# Patient Record
Sex: FEMALE | Race: WHITE | NOT HISPANIC OR LATINO | Employment: OTHER | URBAN - METROPOLITAN AREA
[De-identification: names, ages, dates, MRNs, and addresses within clinical notes are randomized per-mention and may not be internally consistent; named-entity substitution may affect disease eponyms.]

---

## 2023-06-14 ENCOUNTER — OFFICE VISIT (OUTPATIENT)
Dept: PRIMARY CARE | Facility: CLINIC | Age: 83
End: 2023-06-14
Payer: MEDICARE

## 2023-06-14 ENCOUNTER — LAB (OUTPATIENT)
Dept: LAB | Facility: LAB | Age: 83
End: 2023-06-14
Payer: MEDICARE

## 2023-06-14 VITALS
SYSTOLIC BLOOD PRESSURE: 130 MMHG | DIASTOLIC BLOOD PRESSURE: 80 MMHG | WEIGHT: 163 LBS | BODY MASS INDEX: 31.83 KG/M2 | HEART RATE: 70 BPM

## 2023-06-14 DIAGNOSIS — I10 PRIMARY HYPERTENSION: ICD-10-CM

## 2023-06-14 DIAGNOSIS — Z00.00 PHYSICAL EXAM, ANNUAL: ICD-10-CM

## 2023-06-14 DIAGNOSIS — N18.31 STAGE 3A CHRONIC KIDNEY DISEASE (MULTI): ICD-10-CM

## 2023-06-14 DIAGNOSIS — Z00.00 MEDICARE ANNUAL WELLNESS VISIT, SUBSEQUENT: Primary | ICD-10-CM

## 2023-06-14 PROBLEM — M85.80 OSTEOPENIA: Status: ACTIVE | Noted: 2023-06-14

## 2023-06-14 PROBLEM — M19.90 OSTEOARTHRITIS (ARTHRITIS DUE TO WEAR AND TEAR OF JOINTS): Status: ACTIVE | Noted: 2023-06-14

## 2023-06-14 PROBLEM — H26.9 CATARACT: Status: ACTIVE | Noted: 2023-06-14

## 2023-06-14 PROBLEM — R73.03 PREDIABETES: Status: ACTIVE | Noted: 2023-06-14

## 2023-06-14 PROBLEM — M54.50 LOW BACK PAIN: Status: ACTIVE | Noted: 2023-06-14

## 2023-06-14 PROBLEM — E78.5 HYPERLIPIDEMIA: Status: ACTIVE | Noted: 2023-06-14

## 2023-06-14 PROBLEM — H40.9 GLAUCOMA: Status: ACTIVE | Noted: 2023-06-14

## 2023-06-14 LAB
ALANINE AMINOTRANSFERASE (SGPT) (U/L) IN SER/PLAS: 9 U/L (ref 7–45)
ALBUMIN (G/DL) IN SER/PLAS: 3.7 G/DL (ref 3.4–5)
ALKALINE PHOSPHATASE (U/L) IN SER/PLAS: 63 U/L (ref 33–136)
ANION GAP IN SER/PLAS: 13 MMOL/L (ref 10–20)
ASPARTATE AMINOTRANSFERASE (SGOT) (U/L) IN SER/PLAS: 12 U/L (ref 9–39)
BASOPHILS (10*3/UL) IN BLOOD BY AUTOMATED COUNT: 0.06 X10E9/L (ref 0–0.1)
BASOPHILS/100 LEUKOCYTES IN BLOOD BY AUTOMATED COUNT: 0.8 % (ref 0–2)
BILIRUBIN TOTAL (MG/DL) IN SER/PLAS: 0.6 MG/DL (ref 0–1.2)
CALCIUM (MG/DL) IN SER/PLAS: 9.1 MG/DL (ref 8.6–10.3)
CARBON DIOXIDE, TOTAL (MMOL/L) IN SER/PLAS: 25 MMOL/L (ref 21–32)
CHLORIDE (MMOL/L) IN SER/PLAS: 106 MMOL/L (ref 98–107)
CREATININE (MG/DL) IN SER/PLAS: 1.2 MG/DL (ref 0.5–1.05)
EOSINOPHILS (10*3/UL) IN BLOOD BY AUTOMATED COUNT: 0.27 X10E9/L (ref 0–0.4)
EOSINOPHILS/100 LEUKOCYTES IN BLOOD BY AUTOMATED COUNT: 3.4 % (ref 0–6)
ERYTHROCYTE DISTRIBUTION WIDTH (RATIO) BY AUTOMATED COUNT: 13.2 % (ref 11.5–14.5)
ERYTHROCYTE MEAN CORPUSCULAR HEMOGLOBIN CONCENTRATION (G/DL) BY AUTOMATED: 31.6 G/DL (ref 32–36)
ERYTHROCYTE MEAN CORPUSCULAR VOLUME (FL) BY AUTOMATED COUNT: 95 FL (ref 80–100)
ERYTHROCYTES (10*6/UL) IN BLOOD BY AUTOMATED COUNT: 4.58 X10E12/L (ref 4–5.2)
GFR FEMALE: 45 ML/MIN/1.73M2
GLUCOSE (MG/DL) IN SER/PLAS: 97 MG/DL (ref 74–99)
HEMATOCRIT (%) IN BLOOD BY AUTOMATED COUNT: 43.3 % (ref 36–46)
HEMOGLOBIN (G/DL) IN BLOOD: 13.7 G/DL (ref 12–16)
IMMATURE GRANULOCYTES/100 LEUKOCYTES IN BLOOD BY AUTOMATED COUNT: 0.4 % (ref 0–0.9)
LEUKOCYTES (10*3/UL) IN BLOOD BY AUTOMATED COUNT: 8 X10E9/L (ref 4.4–11.3)
LYMPHOCYTES (10*3/UL) IN BLOOD BY AUTOMATED COUNT: 1.5 X10E9/L (ref 0.8–3)
LYMPHOCYTES/100 LEUKOCYTES IN BLOOD BY AUTOMATED COUNT: 18.9 % (ref 13–44)
MONOCYTES (10*3/UL) IN BLOOD BY AUTOMATED COUNT: 0.49 X10E9/L (ref 0.05–0.8)
MONOCYTES/100 LEUKOCYTES IN BLOOD BY AUTOMATED COUNT: 6.2 % (ref 2–10)
NEUTROPHILS (10*3/UL) IN BLOOD BY AUTOMATED COUNT: 5.6 X10E9/L (ref 1.6–5.5)
NEUTROPHILS/100 LEUKOCYTES IN BLOOD BY AUTOMATED COUNT: 70.3 % (ref 40–80)
PLATELETS (10*3/UL) IN BLOOD AUTOMATED COUNT: 175 X10E9/L (ref 150–450)
POTASSIUM (MMOL/L) IN SER/PLAS: 4.9 MMOL/L (ref 3.5–5.3)
PROTEIN TOTAL: 6.3 G/DL (ref 6.4–8.2)
SODIUM (MMOL/L) IN SER/PLAS: 139 MMOL/L (ref 136–145)
UREA NITROGEN (MG/DL) IN SER/PLAS: 20 MG/DL (ref 6–23)

## 2023-06-14 PROCEDURE — 3075F SYST BP GE 130 - 139MM HG: CPT | Performed by: FAMILY MEDICINE

## 2023-06-14 PROCEDURE — G0439 PPPS, SUBSEQ VISIT: HCPCS | Performed by: FAMILY MEDICINE

## 2023-06-14 PROCEDURE — 1170F FXNL STATUS ASSESSED: CPT | Performed by: FAMILY MEDICINE

## 2023-06-14 PROCEDURE — 36415 COLL VENOUS BLD VENIPUNCTURE: CPT

## 2023-06-14 PROCEDURE — 99397 PER PM REEVAL EST PAT 65+ YR: CPT | Performed by: FAMILY MEDICINE

## 2023-06-14 PROCEDURE — 99213 OFFICE O/P EST LOW 20 MIN: CPT | Performed by: FAMILY MEDICINE

## 2023-06-14 PROCEDURE — 80053 COMPREHEN METABOLIC PANEL: CPT

## 2023-06-14 PROCEDURE — 85025 COMPLETE CBC W/AUTO DIFF WBC: CPT

## 2023-06-14 PROCEDURE — 3079F DIAST BP 80-89 MM HG: CPT | Performed by: FAMILY MEDICINE

## 2023-06-14 PROCEDURE — 1036F TOBACCO NON-USER: CPT | Performed by: FAMILY MEDICINE

## 2023-06-14 PROCEDURE — 1160F RVW MEDS BY RX/DR IN RCRD: CPT | Performed by: FAMILY MEDICINE

## 2023-06-14 PROCEDURE — 1159F MED LIST DOCD IN RCRD: CPT | Performed by: FAMILY MEDICINE

## 2023-06-14 RX ORDER — CARVEDILOL 25 MG/1
25 TABLET ORAL
COMMUNITY
End: 2023-06-14 | Stop reason: SDUPTHER

## 2023-06-14 RX ORDER — CARVEDILOL 25 MG/1
25 TABLET ORAL
Qty: 180 TABLET | Refills: 1 | Status: SHIPPED | OUTPATIENT
Start: 2023-06-14 | End: 2024-01-16 | Stop reason: SDUPTHER

## 2023-06-14 ASSESSMENT — ENCOUNTER SYMPTOMS
DEPRESSION: 0
OCCASIONAL FEELINGS OF UNSTEADINESS: 0
LOSS OF SENSATION IN FEET: 0

## 2023-06-14 ASSESSMENT — ACTIVITIES OF DAILY LIVING (ADL)
TAKING_MEDICATION: INDEPENDENT
MANAGING_FINANCES: INDEPENDENT
BATHING: INDEPENDENT
DOING_HOUSEWORK: INDEPENDENT
GROCERY_SHOPPING: INDEPENDENT
DRESSING: INDEPENDENT

## 2023-06-14 ASSESSMENT — PATIENT HEALTH QUESTIONNAIRE - PHQ9
SUM OF ALL RESPONSES TO PHQ9 QUESTIONS 1 AND 2: 0
1. LITTLE INTEREST OR PLEASURE IN DOING THINGS: NOT AT ALL
1. LITTLE INTEREST OR PLEASURE IN DOING THINGS: NOT AT ALL
2. FEELING DOWN, DEPRESSED OR HOPELESS: NOT AT ALL
2. FEELING DOWN, DEPRESSED OR HOPELESS: NOT AT ALL
SUM OF ALL RESPONSES TO PHQ9 QUESTIONS 1 AND 2: 0

## 2023-06-14 NOTE — ASSESSMENT & PLAN NOTE
Stable. No changes to medical management    [Normal] : normal rate, regular rhythm, normal S1 and S2 and no murmur heard [No Carotid Bruits] : no carotid bruits [No Edema] : there was no peripheral edema [de-identified] : unable to touch left 5th finger to thumb

## 2023-06-14 NOTE — PROGRESS NOTES
Subjective   Patient ID: Gloria Yousif is a 82 y.o. female who presents for Medicare Annual Wellness Visit Subsequent and Hypertension, CKD.    HPI  1.  Medicare wellness/physical exam  Overall patient is doing well.   Immunization: Tdap 2015   Up to date on PNA, declined Shingrix  OB/GYN: Declined mammogram  Diet: healthy diet  Exercise: active around the home  Tobacco: Denies use  EtOH: one glass of wine per day    2.  Hypertension  Blood pressure controlled on Carvedilol 25mg every day  Denies any chest pain, blurred vision, or lower extremity welling    3.  CKD stage 3  GFR 2021: 47  Denies any urinary changes    Review of Systems  All pertinent positive symptoms are included in the history of present illness.    All other systems have been reviewed and are negative and noncontributory to this patient's current ailments.     Allergies   Allergen Reactions    Amlodipine Unknown and Rash    Amoxicillin Rash        Immunization History   Administered Date(s) Administered    Influenza, High Dose Seasonal, Preservative Free 10/28/2019, 10/28/2020, 11/03/2021, 11/29/2022    Pfizer Purple Cap SARS-CoV-2 02/20/2021, 03/20/2021    Pneumococcal Polysaccharide PPSV23 09/29/2004, 10/28/2019    Td (adult), 5 Lf tetanus toxoid, preservative free, adsorbed 09/29/2004    Tdap 03/12/2015       Objective   Vitals:    06/14/23 1049 06/14/23 1058   BP: 130/82 130/80   Pulse: 70    Weight: 73.9 kg (163 lb)        Physical Exam  CONSTITUTIONAL - well nourished, well developed, looks like stated age, in no acute distress, not ill-appearing, and not tired appearing  SKIN - normal skin color and pigmentation, normal skin turgor without rash, lesions, or nodules visualized  HEAD - no trauma, normocephalic  NECK - supple without rigidity, no neck mass was observed, no thyromegaly or thyroid nodules  CHEST - clear to auscultation, no wheezing, no crackles and no rales, good effort  CARDIAC - regular rate and regular rhythm, no  skipped beats, no murmur  EXTREMITIES - no edema, no deformities  NEUROLOGICAL - normal gait, normal balance, normal motor, no ataxia, alert, oriented and no focal signs  PSYCHIATRIC - alert, pleasant and cordial, age-appropriate  IMMUNOLOGIC - no cervical lymphadenopathy     Assessment/Plan   Problem List Items Addressed This Visit       Stage 3a chronic kidney disease     Stable. No changes to medical management          Relevant Orders    Comprehensive metabolic panel    CBC and Auto Differential    Hypertension     Stable. No changes to medical management         Relevant Medications    carvedilol (Coreg) 25 mg tablet    Other Relevant Orders    Comprehensive metabolic panel    CBC and Auto Differential    Medicare annual wellness visit, subsequent - Primary     Questions reviewed  Immunizations up-to-date           Other Visit Diagnoses       Physical exam, annual        Complete history and physical examination was performed  We will notify of test results once available

## 2024-01-16 ENCOUNTER — OFFICE VISIT (OUTPATIENT)
Dept: PRIMARY CARE | Facility: CLINIC | Age: 84
End: 2024-01-16
Payer: MEDICARE

## 2024-01-16 VITALS
HEIGHT: 60 IN | HEART RATE: 76 BPM | WEIGHT: 161 LBS | BODY MASS INDEX: 31.61 KG/M2 | SYSTOLIC BLOOD PRESSURE: 120 MMHG | DIASTOLIC BLOOD PRESSURE: 82 MMHG

## 2024-01-16 DIAGNOSIS — R06.02 SOB (SHORTNESS OF BREATH) ON EXERTION: ICD-10-CM

## 2024-01-16 DIAGNOSIS — N18.31 STAGE 3A CHRONIC KIDNEY DISEASE (MULTI): ICD-10-CM

## 2024-01-16 DIAGNOSIS — I10 PRIMARY HYPERTENSION: ICD-10-CM

## 2024-01-16 DIAGNOSIS — Z00.00 PHYSICAL EXAM, ANNUAL: ICD-10-CM

## 2024-01-16 DIAGNOSIS — Z00.00 MEDICARE ANNUAL WELLNESS VISIT, SUBSEQUENT: Primary | ICD-10-CM

## 2024-01-16 DIAGNOSIS — M25.551 PAIN OF RIGHT HIP: ICD-10-CM

## 2024-01-16 PROCEDURE — 99397 PER PM REEVAL EST PAT 65+ YR: CPT | Performed by: FAMILY MEDICINE

## 2024-01-16 PROCEDURE — 99214 OFFICE O/P EST MOD 30 MIN: CPT | Performed by: FAMILY MEDICINE

## 2024-01-16 PROCEDURE — 1160F RVW MEDS BY RX/DR IN RCRD: CPT | Performed by: FAMILY MEDICINE

## 2024-01-16 PROCEDURE — 1170F FXNL STATUS ASSESSED: CPT | Performed by: FAMILY MEDICINE

## 2024-01-16 PROCEDURE — 3074F SYST BP LT 130 MM HG: CPT | Performed by: FAMILY MEDICINE

## 2024-01-16 PROCEDURE — 3079F DIAST BP 80-89 MM HG: CPT | Performed by: FAMILY MEDICINE

## 2024-01-16 PROCEDURE — 1123F ACP DISCUSS/DSCN MKR DOCD: CPT | Performed by: FAMILY MEDICINE

## 2024-01-16 PROCEDURE — 1159F MED LIST DOCD IN RCRD: CPT | Performed by: FAMILY MEDICINE

## 2024-01-16 PROCEDURE — 1158F ADVNC CARE PLAN TLK DOCD: CPT | Performed by: FAMILY MEDICINE

## 2024-01-16 PROCEDURE — G0439 PPPS, SUBSEQ VISIT: HCPCS | Performed by: FAMILY MEDICINE

## 2024-01-16 PROCEDURE — 1036F TOBACCO NON-USER: CPT | Performed by: FAMILY MEDICINE

## 2024-01-16 RX ORDER — CARVEDILOL 25 MG/1
25 TABLET ORAL
Qty: 180 TABLET | Refills: 1 | Status: SHIPPED | OUTPATIENT
Start: 2024-01-16 | End: 2024-07-14

## 2024-01-16 ASSESSMENT — PATIENT HEALTH QUESTIONNAIRE - PHQ9
2. FEELING DOWN, DEPRESSED OR HOPELESS: NOT AT ALL
2. FEELING DOWN, DEPRESSED OR HOPELESS: NOT AT ALL
SUM OF ALL RESPONSES TO PHQ9 QUESTIONS 1 AND 2: 0
1. LITTLE INTEREST OR PLEASURE IN DOING THINGS: NOT AT ALL
SUM OF ALL RESPONSES TO PHQ9 QUESTIONS 1 AND 2: 0
1. LITTLE INTEREST OR PLEASURE IN DOING THINGS: NOT AT ALL

## 2024-01-16 ASSESSMENT — ACTIVITIES OF DAILY LIVING (ADL)
GROCERY_SHOPPING: INDEPENDENT
DOING_HOUSEWORK: INDEPENDENT
MANAGING_FINANCES: INDEPENDENT
TAKING_MEDICATION: INDEPENDENT
DRESSING: INDEPENDENT
BATHING: INDEPENDENT

## 2024-01-16 ASSESSMENT — ENCOUNTER SYMPTOMS
LOSS OF SENSATION IN FEET: 0
OCCASIONAL FEELINGS OF UNSTEADINESS: 0
DEPRESSION: 0

## 2024-01-16 NOTE — PROGRESS NOTES
Subjective   Reason for Visit: Gloria Yousif is an 83 y.o. female here for a Medicare Annual Wellness Visit Subsequent and Hypertension.     Past Medical, Surgical, and Family History reviewed and updated in chart.    Reviewed all medications by prescribing practitioner or clinical pharmacist (such as prescriptions, OTCs, herbal therapies and supplements) and documented in the medical record.    HPI  1.  Medicare wellness/physical exam.  Overall patient is doing well.   Immunization: Tdap 2015   Up to date on PNA, declined Shingrix  OB/GYN: No further screening warranted  Colonoscopy: No further screening warranted  Diet: healthy diet  Exercise: active around the home  Tobacco: Denies use  EtOH: one glass of wine per day    2.  Hypertension.  Patient is doing well on carvedilol 25 mg twice daily  Blood pressure in office today is 120/82  Denies headache, syncope, or nausea  Requesting refill today    3.  Shortness of breath on exertion.  Gloria has conveyed that she has been experiencing shortness of breath during exertion for the past year. The severity of the symptoms has remained consistent since they first appeared. It is important to note that Gloria does not have a history of smoking, asthma, or Chronic Obstructive Pulmonary Disease (COPD). A review of her medical records indicates that the imaging of her heart and lungs from a chest x-ray and CT scan, conducted on 04/06/19, were normal.    During these episodes, Gloria reports that she does not find it challenging to breathe. She experiences shortness of breath only when engaging in activities such as ascending stairs. For instance, she narrated an instance where she climbed 30 steps and felt slightly breathless. However, Golria denies experiencing chest pains, palpitations, wheezing, syncope, orthopnea, or edema.    4.  Right hip pain.  Gloria has been experiencing pain in her right hip for the past six months, which has gradually escalated  over time. She denies any instances of injury and discloses that the pain is intermittent, seeming to alleviate with movement. Gloria describes the pain as originating in the lumbar spine area on the right side and radiating downwards to her lateral mid-thigh. However, she clarifies that the pain does not present in a shooting manner.    For the past six months, Gloria has been taking Aleve every morning. However, this has only provided her with minimal relief from her symptoms. Given her history of arthritis in her right ankle, Gloria suspects that the pain in her hip might be related to arthritic changes.    Review of Systems  All pertinent positive symptoms are included in the history of present illness.    All other systems have been reviewed and are negative and noncontributory to this patient's current ailments.    Past Medical History:   Diagnosis Date    Chronic kidney disease, stage 3a (CMS/HCC) 11/03/2021    Stage 3a chronic kidney disease     Past Surgical History:   Procedure Laterality Date    ANKLE ARTHROSCOPY W/ OPEN REPAIR  08/01/2014    Ankle Repair    BREAST LUMPECTOMY  08/01/2014    Breast Surgery Lumpectomy    COLONOSCOPY  08/01/2014    Colonoscopy (Fiberoptic)    EYE SURGERY  02/05/2015    Eye Surgery     Social History     Tobacco Use    Smoking status: Never    Smokeless tobacco: Never     No family history on file.  Allergies   Allergen Reactions    Amlodipine Unknown and Rash    Amoxicillin Rash     Immunization History   Administered Date(s) Administered    Influenza, High Dose Seasonal, Preservative Free 10/28/2019, 10/28/2020, 11/03/2021, 11/29/2022    Pfizer Purple Cap SARS-CoV-2 02/20/2021, 03/20/2021    Pneumococcal polysaccharide vaccine, 23-valent, age 2 years and older (PNEUMOVAX 23) 09/29/2004, 10/28/2019    Td vaccine, age 7 years and older (TENIVAC) 09/29/2004    Tdap vaccine, age 7 year and older (BOOSTRIX) 03/12/2015     Current Outpatient Medications   Medication  Instructions    carvedilol (COREG) 25 mg, oral, 2 times daily with meals       Patient Self Assessment of Health Status  Patient Self Assessment: Good    Nutrition and Exercise  Current Diet: Well Balanced Diet  Adequate Fluid Intake: Yes  Caffeine: Yes  Exercise Frequency: Infrequently    Functional Ability/Level of Safety  Cognitive Impairment Observed: No cognitive impairment observed  Cognitive Impairment Reported: No cognitive impairment reported by patient or family    Home Safety Risk Factors: None    Objective   Visit Vitals  /82   Pulse 76   Ht 1.524 m (5')   Wt 73 kg (161 lb)   BMI 31.44 kg/m²   Smoking Status Never   BSA 1.76 m²      No visits with results within 1 Month(s) from this visit.   Latest known visit with results is:   Lab on 06/14/2023   Component Date Value    Glucose 06/14/2023 97     Sodium 06/14/2023 139     Potassium 06/14/2023 4.9     Chloride 06/14/2023 106     Bicarbonate 06/14/2023 25     Anion Gap 06/14/2023 13     Urea Nitrogen 06/14/2023 20     Creatinine 06/14/2023 1.20 (H)     GFR Female 06/14/2023 45 (A)     Calcium 06/14/2023 9.1     Albumin 06/14/2023 3.7     Alkaline Phosphatase 06/14/2023 63     Total Protein 06/14/2023 6.3 (L)     AST 06/14/2023 12     Total Bilirubin 06/14/2023 0.6     ALT (SGPT) 06/14/2023 9     WBC 06/14/2023 8.0     RBC 06/14/2023 4.58     Hemoglobin 06/14/2023 13.7     Hematocrit 06/14/2023 43.3     MCV 06/14/2023 95     MCHC 06/14/2023 31.6 (L)     Platelets 06/14/2023 175     RDW 06/14/2023 13.2     Neutrophils % 06/14/2023 70.3     Immature Granulocytes %,* 06/14/2023 0.4     Lymphocytes % 06/14/2023 18.9     Monocytes % 06/14/2023 6.2     Eosinophils % 06/14/2023 3.4     Basophils % 06/14/2023 0.8     Neutrophils Absolute 06/14/2023 5.60 (H)     Lymphocytes Absolute 06/14/2023 1.50     Monocytes Absolute 06/14/2023 0.49     Eosinophils Absolute 06/14/2023 0.27     Basophils Absolute 06/14/2023 0.06        Physical Exam  CONSTITUTIONAL - well  nourished, well developed, looks like stated age, in no acute distress, not ill-appearing, and not tired appearing  SKIN - normal skin color and pigmentation, normal skin turgor without rash, lesions, or nodules visualized  HEAD - no trauma, normocephalic  EYES - pupils are equal and reactive to light, extraocular muscles are intact, and normal external exam  NECK - supple without rigidity, no neck mass was observed, no thyromegaly or thyroid nodules  CHEST - clear to auscultation, no wheezing, no crackles and no rales, good effort  CARDIAC - regular rate and regular rhythm, no skipped beats, no murmur  EXTREMITIES - no obvious or evident edema, no obvious or evident deformities  NEUROLOGICAL - normal gait, normal balance, normal motor, no ataxia; alert, oriented and no focal signs  PSYCHIATRIC - alert, pleasant and cordial, age-appropriate    Assessment/Plan   Problem List Items Addressed This Visit       Stage 3a chronic kidney disease (CMS/HCC)    Current Assessment & Plan     Please complete fasting labs at your earliest convenience. If GFR drops below 45, we should consider nephrology consult         Relevant Orders    Basic metabolic panel    Hypertension    Current Assessment & Plan     Stable, please continue medication as prescribed.         Relevant Medications    carvedilol (Coreg) 25 mg tablet    Other Relevant Orders    Basic metabolic panel    Medicare annual wellness visit, subsequent - Primary    Current Assessment & Plan     Questions were reviewed and answered         Pain of right hip    Current Assessment & Plan     I ordered an x-ray to assess for arthritis. Please complete within the next week. Once I have seen the results, we can discuss best next steps.         Relevant Orders    XR hip right with pelvis when performed 2 or 3 views    SOB (shortness of breath) on exertion    Current Assessment & Plan     I would like to rule out any cardiac causes. I have provided a referral to your preferred  provider, Melania Fan, [facility], because your  has been seen by her in the past, and you have requested to see her         Relevant Orders    Referral to Cardiology    Physical exam, annual    Current Assessment & Plan     Complete history and physical examination was performed  We will notify of test results once available          Patient Care Team:  Félix Arthur DO as PCP - General (Family Medicine)  Félix Arthur DO as PCP - [employer] PCP

## 2024-01-16 NOTE — ASSESSMENT & PLAN NOTE
I ordered an x-ray to assess for arthritis. Please complete within the next week. Once I have seen the results, we can discuss best next steps.

## 2024-01-16 NOTE — ASSESSMENT & PLAN NOTE
I would like to rule out any cardiac causes. I have provided a referral to your preferred provider, Melania Fan NP, because your  has been seen by her in the past, and you have requested to see her

## 2024-01-16 NOTE — ASSESSMENT & PLAN NOTE
Please complete fasting labs at your earliest convenience. If GFR drops below 45, we should consider nephrology consult

## 2024-01-17 ENCOUNTER — ANCILLARY PROCEDURE (OUTPATIENT)
Dept: RADIOLOGY | Facility: CLINIC | Age: 84
End: 2024-01-17
Payer: MEDICARE

## 2024-01-17 ENCOUNTER — LAB (OUTPATIENT)
Dept: LAB | Facility: LAB | Age: 84
End: 2024-01-17
Payer: MEDICARE

## 2024-01-17 DIAGNOSIS — M25.551 PAIN OF RIGHT HIP: ICD-10-CM

## 2024-01-17 DIAGNOSIS — N18.31 STAGE 3A CHRONIC KIDNEY DISEASE (MULTI): ICD-10-CM

## 2024-01-17 DIAGNOSIS — I10 PRIMARY HYPERTENSION: ICD-10-CM

## 2024-01-17 LAB
ANION GAP SERPL CALC-SCNC: 14 MMOL/L (ref 10–20)
BUN SERPL-MCNC: 22 MG/DL (ref 6–23)
CALCIUM SERPL-MCNC: 9.2 MG/DL (ref 8.6–10.6)
CHLORIDE SERPL-SCNC: 103 MMOL/L (ref 98–107)
CO2 SERPL-SCNC: 27 MMOL/L (ref 21–32)
CREAT SERPL-MCNC: 1.11 MG/DL (ref 0.5–1.05)
EGFRCR SERPLBLD CKD-EPI 2021: 49 ML/MIN/1.73M*2
GLUCOSE SERPL-MCNC: 115 MG/DL (ref 74–99)
POTASSIUM SERPL-SCNC: 4.3 MMOL/L (ref 3.5–5.3)
SODIUM SERPL-SCNC: 140 MMOL/L (ref 136–145)

## 2024-01-17 PROCEDURE — 73502 X-RAY EXAM HIP UNI 2-3 VIEWS: CPT | Mod: RT

## 2024-01-17 PROCEDURE — 80048 BASIC METABOLIC PNL TOTAL CA: CPT

## 2024-01-17 PROCEDURE — 36415 COLL VENOUS BLD VENIPUNCTURE: CPT

## 2024-01-17 PROCEDURE — 73502 X-RAY EXAM HIP UNI 2-3 VIEWS: CPT | Mod: RIGHT SIDE | Performed by: RADIOLOGY

## 2024-01-18 NOTE — RESULT ENCOUNTER NOTE
Looks like your kidney function has remained stable as have your electrolytes    I would recommend following up with me in 6 months please

## 2024-01-19 NOTE — RESULT ENCOUNTER NOTE
As we predicted, there is some pretty significant arthritis in your right hip.  If you are interested in pursuing an orthopedic consult to at least consider a steroid injection, I would be more than happy to make that recommendation for you

## 2024-01-22 ENCOUNTER — OFFICE VISIT (OUTPATIENT)
Dept: CARDIOLOGY | Facility: HOSPITAL | Age: 84
End: 2024-01-22
Payer: MEDICARE

## 2024-01-22 VITALS
SYSTOLIC BLOOD PRESSURE: 142 MMHG | OXYGEN SATURATION: 99 % | HEART RATE: 61 BPM | DIASTOLIC BLOOD PRESSURE: 72 MMHG | BODY MASS INDEX: 31.09 KG/M2 | WEIGHT: 159.17 LBS

## 2024-01-22 DIAGNOSIS — I10 PRIMARY HYPERTENSION: ICD-10-CM

## 2024-01-22 DIAGNOSIS — R06.02 SOB (SHORTNESS OF BREATH) ON EXERTION: Primary | ICD-10-CM

## 2024-01-22 PROCEDURE — 99203 OFFICE O/P NEW LOW 30 MIN: CPT | Performed by: NURSE PRACTITIONER

## 2024-01-22 PROCEDURE — 1160F RVW MEDS BY RX/DR IN RCRD: CPT | Performed by: NURSE PRACTITIONER

## 2024-01-22 PROCEDURE — 3078F DIAST BP <80 MM HG: CPT | Performed by: NURSE PRACTITIONER

## 2024-01-22 PROCEDURE — 93005 ELECTROCARDIOGRAM TRACING: CPT | Performed by: NURSE PRACTITIONER

## 2024-01-22 PROCEDURE — 93010 ELECTROCARDIOGRAM REPORT: CPT | Performed by: INTERNAL MEDICINE

## 2024-01-22 PROCEDURE — 3077F SYST BP >= 140 MM HG: CPT | Performed by: NURSE PRACTITIONER

## 2024-01-22 PROCEDURE — 1036F TOBACCO NON-USER: CPT | Performed by: NURSE PRACTITIONER

## 2024-01-22 PROCEDURE — 1159F MED LIST DOCD IN RCRD: CPT | Performed by: NURSE PRACTITIONER

## 2024-01-22 PROCEDURE — 99213 OFFICE O/P EST LOW 20 MIN: CPT | Performed by: NURSE PRACTITIONER

## 2024-01-22 ASSESSMENT — ENCOUNTER SYMPTOMS
NEUROLOGICAL NEGATIVE: 1
RESPIRATORY NEGATIVE: 1
HEMATOLOGIC/LYMPHATIC NEGATIVE: 1
ENDOCRINE NEGATIVE: 1
GASTROINTESTINAL NEGATIVE: 1
DEPRESSION: 0
DYSPNEA ON EXERTION: 1
MYALGIAS: 1
EYES NEGATIVE: 1
CONSTITUTIONAL NEGATIVE: 1
PSYCHIATRIC NEGATIVE: 1

## 2024-01-22 NOTE — PROGRESS NOTES
Referred by Dr. Arthur for Shortness of Breath (Intermittently on exertion, on incline for 3 mths.  NPV.  PCP referral.)     History Of Present Illness:    Dear Dr. Arthur,     I had the pleasure of meeting Mrs. Yousif today at [facility] for evaluation of shortness of breath. The patient is seen in collaboration with Dr. Dahl. Mrs. Yousif is a very pleasant 83 year old female with a history of CKD, HTN, and ankle surgery, she denies history of smoking. Father had a history of CAD. She has noticed shortness of breath walking up the stairs, states she recovers quickly. Denies chest pain or heart palpitations. Continues to stay active around the house.        Review of Systems   Constitutional: Negative.   HENT: Negative.     Eyes: Negative.    Cardiovascular:  Positive for dyspnea on exertion.   Respiratory: Negative.     Endocrine: Negative.    Hematologic/Lymphatic: Negative.    Skin: Negative.    Musculoskeletal:  Positive for myalgias.   Gastrointestinal: Negative.    Neurological: Negative.    Psychiatric/Behavioral: Negative.            Past Medical History:  She has a past medical history of Chronic kidney disease, stage 3a (CMS/HCC) (11/03/2021).    Past Surgical History:  She has a past surgical history that includes Ankle arthroscopy w/ open repair (08/01/2014); Breast lumpectomy (08/01/2014); Colonoscopy (08/01/2014); and Eye surgery (02/05/2015).      Social History:  She reports that she has never smoked. She has never used smokeless tobacco. She reports current alcohol use of about 7.0 standard drinks of alcohol per week. She reports that she does not use drugs.    Family History:  No family history on file.     Allergies:  Amlodipine and Amoxicillin    Outpatient Medications:  Current Outpatient Medications   Medication Instructions    carvedilol (COREG) 25 mg, oral, 2 times daily with meals        Last Recorded Vitals:  Vitals:    01/22/24 1122   BP: (!) 212/76  "  Pulse: 61   SpO2: 99%   Weight: 72.2 kg (159 lb 2.8 oz)       Physical Exam:  Physical Exam  Vitals reviewed.   HENT:      Head: Normocephalic.      Nose: Nose normal.   Eyes:      Pupils: Pupils are equal, round, and reactive to light.   Cardiovascular:      Rate and Rhythm: Normal rate and regular rhythm.   Pulmonary:      Effort: Pulmonary effort is normal.      Breath sounds: Normal breath sounds.   Abdominal:      General: Abdomen is flat.      Palpations: Abdomen is soft.   Musculoskeletal:         General: Normal range of motion.      Cervical back: Normal range of motion.   Skin:     General: Skin is warm and dry.   Neurological:      General: No focal deficit present.      Mental Status: He is alert and oriented to person, place, and time.   Psychiatric:         Mood and Affect: Mood normal.            Last Labs:  CBC -  Lab Results   Component Value Date    WBC 8.0 06/14/2023    HGB 13.7 06/14/2023    HCT 43.3 06/14/2023    MCV 95 06/14/2023     06/14/2023       CMP -  Lab Results   Component Value Date    CALCIUM 9.2 01/17/2024    PROT 6.3 (L) 06/14/2023    ALBUMIN 3.7 06/14/2023    AST 12 06/14/2023    ALT 9 06/14/2023    ALKPHOS 63 06/14/2023    BILITOT 0.6 06/14/2023       LIPID PANEL -   No results found for: \"CHOL\", \"TRIG\", \"HDL\", \"CHHDL\", \"LDLF\", \"VLDL\", \"NHDL\"    RENAL FUNCTION PANEL -   Lab Results   Component Value Date    GLUCOSE 115 (H) 01/17/2024     01/17/2024    K 4.3 01/17/2024     01/17/2024    CO2 27 01/17/2024    ANIONGAP 14 01/17/2024    BUN 22 01/17/2024    CREATININE 1.11 (H) 01/17/2024    CALCIUM 9.2 01/17/2024    ALBUMIN 3.7 06/14/2023        Lab Results   Component Value Date     (H) 04/06/2019       Last Cardiology Tests:  ECG:  EKG independently reviewed from today showed sinus bradycardia heart rate 56 bpm   Echo:    Ejection Fractions:    Cath:    Stress Test:  Cardiac Imaging:      Assessment/Plan   Mrs. Yousif is a very pleasant 83 year old " female with a history of HTN, she complains of shortness of breath when walking up the stairs. She will have an echocardiogram to evaluate her LV function. Heart rate and blood pressure are well controlled today     Plan   -continue the Carvedilol   -echocardiogram   -will call to review the results     I appreciate the opportunity to participate in the patient's care, please call with any questions     AMEENA Osullivan-CNP

## 2024-01-22 NOTE — PATIENT INSTRUCTIONS
CALL WITH ANY QUESTIONS   NO MEDICATION CHANGES   ECHOCARDIOGRAM   WILL CALL TO REVIEW THE RESULTS

## 2024-02-06 ENCOUNTER — HOSPITAL ENCOUNTER (OUTPATIENT)
Dept: CARDIOLOGY | Facility: HOSPITAL | Age: 84
Discharge: HOME | End: 2024-02-06
Payer: MEDICARE

## 2024-02-06 DIAGNOSIS — R06.02 SOB (SHORTNESS OF BREATH) ON EXERTION: ICD-10-CM

## 2024-02-06 PROCEDURE — 93306 TTE W/DOPPLER COMPLETE: CPT | Performed by: INTERNAL MEDICINE

## 2024-02-06 PROCEDURE — 93306 TTE W/DOPPLER COMPLETE: CPT

## 2024-02-07 LAB
AORTIC VALVE MEAN GRADIENT: 3 MMHG
AORTIC VALVE PEAK VELOCITY: 1.31 M/S
AV PEAK GRADIENT: 6.9 MMHG
AVA (PEAK VEL): 1.87 CM2
AVA (VTI): 2.09 CM2
EJECTION FRACTION APICAL 4 CHAMBER: 52.5
EJECTION FRACTION: 52 %
LEFT ATRIUM VOLUME AREA LENGTH INDEX BSA: 29.1 ML/M2
LEFT VENTRICLE INTERNAL DIMENSION DIASTOLE: 3.93 CM (ref 3.5–6)
LEFT VENTRICULAR OUTFLOW TRACT DIAMETER: 1.9 CM
MITRAL VALVE E/A RATIO: 0.92
MITRAL VALVE E/E' RATIO: 18.75
RIGHT VENTRICLE FREE WALL PEAK S': 13 CM/S
RIGHT VENTRICLE PEAK SYSTOLIC PRESSURE: 44.3 MMHG
TRICUSPID ANNULAR PLANE SYSTOLIC EXCURSION: 2.4 CM

## 2024-02-09 ENCOUNTER — TELEPHONE (OUTPATIENT)
Dept: CARDIOLOGY | Facility: HOSPITAL | Age: 84
End: 2024-02-09
Payer: MEDICARE

## 2024-02-09 NOTE — TELEPHONE ENCOUNTER
----- Message from SIMBA Bonilla sent at 2/7/2024  8:14 AM EST -----  Please call and let her know her echo shows she has a strong heart. Two valves leak a little bit but it would not be the cause of her shortness of breath   Thanks   ----- Message -----  From: Shruthi, Syngo - Cardiology Results In  Sent: 2/7/2024   7:23 AM EST  To: SIMBA Bonilla

## 2024-03-10 LAB
ATRIAL RATE: 56 BPM
P AXIS: 15 DEGREES
P OFFSET: 187 MS
P ONSET: 129 MS
PR INTERVAL: 192 MS
Q ONSET: 225 MS
QRS COUNT: 9 BEATS
QRS DURATION: 80 MS
QT INTERVAL: 410 MS
QTC CALCULATION(BAZETT): 395 MS
QTC FREDERICIA: 401 MS
R AXIS: 20 DEGREES
T AXIS: 38 DEGREES
T OFFSET: 430 MS
VENTRICULAR RATE: 56 BPM

## 2024-04-01 ENCOUNTER — OFFICE VISIT (OUTPATIENT)
Dept: PRIMARY CARE | Facility: CLINIC | Age: 84
End: 2024-04-01
Payer: MEDICARE

## 2024-04-01 VITALS
BODY MASS INDEX: 31.41 KG/M2 | DIASTOLIC BLOOD PRESSURE: 68 MMHG | WEIGHT: 160 LBS | SYSTOLIC BLOOD PRESSURE: 130 MMHG | HEIGHT: 60 IN | HEART RATE: 69 BPM

## 2024-04-01 DIAGNOSIS — G57.01 PIRIFORMIS SYNDROME, RIGHT: Primary | ICD-10-CM

## 2024-04-01 PROCEDURE — 3075F SYST BP GE 130 - 139MM HG: CPT | Performed by: FAMILY MEDICINE

## 2024-04-01 PROCEDURE — 1159F MED LIST DOCD IN RCRD: CPT | Performed by: FAMILY MEDICINE

## 2024-04-01 PROCEDURE — 1123F ACP DISCUSS/DSCN MKR DOCD: CPT | Performed by: FAMILY MEDICINE

## 2024-04-01 PROCEDURE — 99213 OFFICE O/P EST LOW 20 MIN: CPT | Performed by: FAMILY MEDICINE

## 2024-04-01 PROCEDURE — 1160F RVW MEDS BY RX/DR IN RCRD: CPT | Performed by: FAMILY MEDICINE

## 2024-04-01 PROCEDURE — 3078F DIAST BP <80 MM HG: CPT | Performed by: FAMILY MEDICINE

## 2024-04-01 PROCEDURE — 1036F TOBACCO NON-USER: CPT | Performed by: FAMILY MEDICINE

## 2024-04-01 ASSESSMENT — PATIENT HEALTH QUESTIONNAIRE - PHQ9
SUM OF ALL RESPONSES TO PHQ9 QUESTIONS 1 AND 2: 0
1. LITTLE INTEREST OR PLEASURE IN DOING THINGS: NOT AT ALL
2. FEELING DOWN, DEPRESSED OR HOPELESS: NOT AT ALL

## 2024-04-01 NOTE — PROGRESS NOTES
Subjective   Patient ID: Gloria Yousif is a 83 y.o. female who presents for Leg Pain (Right side including hip).    Past Medical, Surgical, and Family History reviewed and updated in chart.    Reviewed all medications by prescribing practitioner or clinical pharmacist (such as prescriptions, OTCs, herbal therapies and supplements) and documented in the medical record.    HPI  Chronic right hip pain and OA confirmed on x-ray from January 2024, resolves with Aleve  However, last week she developed a posterior right hip pain that travels down the back of her leg to her ankle  This pain is usually worse in the morning when she first wakes up but gets better throughout the day especially after walking  Denies numbness, tingling  Aleve has not been helpful for this particular pain    Review of Systems  All pertinent positive symptoms are included in the history of present illness.    All other systems have been reviewed and are negative and noncontributory to this patient's current ailments.    Past Medical History:   Diagnosis Date    Chronic kidney disease, stage 3a (CMS/HCC) 11/03/2021    Stage 3a chronic kidney disease     Past Surgical History:   Procedure Laterality Date    ANKLE ARTHROSCOPY W/ OPEN REPAIR  08/01/2014    Ankle Repair    BREAST LUMPECTOMY  08/01/2014    Breast Surgery Lumpectomy    COLONOSCOPY  08/01/2014    Colonoscopy (Fiberoptic)    EYE SURGERY  02/05/2015    Eye Surgery     Social History     Tobacco Use    Smoking status: Never    Smokeless tobacco: Never   Substance Use Topics    Alcohol use: Yes     Alcohol/week: 7.0 standard drinks of alcohol     Types: 7 Glasses of wine per week    Drug use: Never     No family history on file.  Immunization History   Administered Date(s) Administered    Influenza, High Dose Seasonal, Preservative Free 10/28/2019, 10/28/2020, 11/03/2021, 11/29/2022    Pfizer Purple Cap SARS-CoV-2 02/20/2021, 03/20/2021    Pneumococcal polysaccharide vaccine,  23-valent, age 2 years and older (PNEUMOVAX 23) 09/29/2004, 10/28/2019    Td vaccine, age 7 years and older (TENIVAC) 09/29/2004    Tdap vaccine, age 7 year and older (BOOSTRIX, ADACEL) 03/12/2015     Current Outpatient Medications   Medication Instructions    carvedilol (COREG) 25 mg, oral, 2 times daily with meals     Allergies   Allergen Reactions    Amlodipine Unknown and Rash    Amoxicillin Rash       Objective   Vitals:    04/01/24 1354   BP: 130/68   Pulse: 69   Weight: 72.6 kg (160 lb)   Height: 1.524 m (5')     Body mass index is 31.25 kg/m².    BP Readings from Last 3 Encounters:   04/01/24 130/68   01/22/24 142/72   01/16/24 120/82      Wt Readings from Last 3 Encounters:   04/01/24 72.6 kg (160 lb)   01/22/24 72.2 kg (159 lb 2.8 oz)   01/16/24 73 kg (161 lb)        No visits with results within 1 Month(s) from this visit.   Latest known visit with results is:   Hospital Outpatient Visit on 02/06/2024   Component Date Value    AV pk henok 02/06/2024 1.31     AV mn grad 02/06/2024 3.0     LVOT diam 02/06/2024 1.90     LV EF 02/06/2024 52     MV avg E/e' ratio 02/06/2024 18.75     MV E/A ratio 02/06/2024 0.92     LA vol index A/L 02/06/2024 29.1     Tricuspid annular plane * 02/06/2024 2.4     RV free wall pk S' 02/06/2024 13.00     LVIDd 02/06/2024 3.93     RVSP 02/06/2024 44.3     Aortic Valve Area by Con* 02/06/2024 2.09     Aortic Valve Area by Con* 02/06/2024 1.87     AV pk grad 02/06/2024 6.9     LV A4C EF 02/06/2024 52.5      Physical Exam  CONSTITUTIONAL - well nourished, well developed, looks like stated age, in no acute distress, not ill-appearing, and not tired appearing  SKIN - normal skin color and pigmentation, normal skin turgor without rash, lesions, or nodules visualized  HEAD - no trauma, normocephalic  CHEST - clear to auscultation, no wheezing, no crackles and no rales, good effort  CARDIAC - regular rate and regular rhythm, no skipped beats, no murmur  EXTREMITIES - no edema, no  deformities  MSK - tenderness to palpation along right piriformis with right hamstring tightness, negative straight leg raise  NEUROLOGICAL - normal gait, normal balance, normal motor  PSYCHIATRIC - alert, pleasant and cordial, age-appropriate     Assessment/Plan   Problem List Items Addressed This Visit       Piriformis syndrome, right - Primary     We established that this particular pain is different from your typical hip pain in that it travels along the posterior aspect of your entire leg and it does not get better after Aleve  Because it improves after stretching and walking, I believe it is likely caused by piriformis syndrome which is a tightening of the muscle which can put compression on the sciatic nerve  You preferred to do home physical therapy so I provided you with a packet of exercises to try  Let us know if you would like a formal physical therapy referral

## 2024-04-01 NOTE — ASSESSMENT & PLAN NOTE
We established that this particular pain is different from your typical hip pain in that it travels along the posterior aspect of your entire leg and it does not get better after Aleve  Because it improves after stretching and walking, I believe it is likely caused by piriformis syndrome which is a tightening of the muscle which can put compression on the sciatic nerve  You preferred to do home physical therapy so I provided you with a packet of exercises to try  Let us know if you would like a formal physical therapy referral

## 2024-07-26 ENCOUNTER — APPOINTMENT (OUTPATIENT)
Dept: PRIMARY CARE | Facility: CLINIC | Age: 84
End: 2024-07-26
Payer: MEDICARE

## 2024-07-26 ENCOUNTER — LAB (OUTPATIENT)
Dept: LAB | Facility: LAB | Age: 84
End: 2024-07-26
Payer: MEDICARE

## 2024-07-26 VITALS
BODY MASS INDEX: 30.86 KG/M2 | SYSTOLIC BLOOD PRESSURE: 118 MMHG | WEIGHT: 158 LBS | DIASTOLIC BLOOD PRESSURE: 70 MMHG | HEART RATE: 58 BPM | OXYGEN SATURATION: 96 %

## 2024-07-26 DIAGNOSIS — N18.31 STAGE 3A CHRONIC KIDNEY DISEASE (MULTI): ICD-10-CM

## 2024-07-26 DIAGNOSIS — N18.31 STAGE 3A CHRONIC KIDNEY DISEASE (MULTI): Primary | ICD-10-CM

## 2024-07-26 DIAGNOSIS — I10 PRIMARY HYPERTENSION: ICD-10-CM

## 2024-07-26 LAB
ANION GAP SERPL CALC-SCNC: 10 MMOL/L (ref 10–20)
BUN SERPL-MCNC: 20 MG/DL (ref 6–23)
CALCIUM SERPL-MCNC: 8.8 MG/DL (ref 8.6–10.3)
CHLORIDE SERPL-SCNC: 104 MMOL/L (ref 98–107)
CO2 SERPL-SCNC: 26 MMOL/L (ref 21–32)
CREAT SERPL-MCNC: 1.17 MG/DL (ref 0.5–1.05)
EGFRCR SERPLBLD CKD-EPI 2021: 46 ML/MIN/1.73M*2
GLUCOSE SERPL-MCNC: 90 MG/DL (ref 74–99)
POTASSIUM SERPL-SCNC: 5 MMOL/L (ref 3.5–5.3)
SODIUM SERPL-SCNC: 135 MMOL/L (ref 136–145)

## 2024-07-26 PROCEDURE — 1160F RVW MEDS BY RX/DR IN RCRD: CPT | Performed by: FAMILY MEDICINE

## 2024-07-26 PROCEDURE — 1036F TOBACCO NON-USER: CPT | Performed by: FAMILY MEDICINE

## 2024-07-26 PROCEDURE — 3074F SYST BP LT 130 MM HG: CPT | Performed by: FAMILY MEDICINE

## 2024-07-26 PROCEDURE — 3078F DIAST BP <80 MM HG: CPT | Performed by: FAMILY MEDICINE

## 2024-07-26 PROCEDURE — 1159F MED LIST DOCD IN RCRD: CPT | Performed by: FAMILY MEDICINE

## 2024-07-26 PROCEDURE — 80048 BASIC METABOLIC PNL TOTAL CA: CPT

## 2024-07-26 PROCEDURE — 99213 OFFICE O/P EST LOW 20 MIN: CPT | Performed by: FAMILY MEDICINE

## 2024-07-26 PROCEDURE — 1123F ACP DISCUSS/DSCN MKR DOCD: CPT | Performed by: FAMILY MEDICINE

## 2024-07-26 PROCEDURE — 36415 COLL VENOUS BLD VENIPUNCTURE: CPT

## 2024-07-26 RX ORDER — CARVEDILOL 25 MG/1
25 TABLET ORAL
Qty: 180 TABLET | Refills: 1 | Status: SHIPPED | OUTPATIENT
Start: 2024-07-26 | End: 2025-01-22

## 2024-07-26 NOTE — PROGRESS NOTES
Subjective   Patient ID: Gloria Yousif is a 83 y.o. female who presents for Hypertension.    Past Medical, Surgical, and Family History reviewed and updated in chart.    Reviewed all medications by prescribing practitioner or clinical pharmacist (such as prescriptions, OTCs, herbal therapies and supplements) and documented in the medical record.    HPI  1.  Hypertension.  Gloria is doing well on carvedilol 25 mg twice daily, requesting a refill  Blood pressure in office today is 118/70  No chest pain, shortness of breath or any other cardiac/respiratory concerns    2.  CKD.  Last GFR done in January was noted to be 49, we willing to have done today  Denies any urinary symptoms    Review of Systems  All pertinent positive symptoms are included in the history of present illness.    All other systems have been reviewed and are negative and noncontributory to this patient's current ailments.    Past Medical History:   Diagnosis Date    Chronic kidney disease, stage 3a (Multi) 11/03/2021    Stage 3a chronic kidney disease     Past Surgical History:   Procedure Laterality Date    ANKLE ARTHROSCOPY W/ OPEN REPAIR  08/01/2014    Ankle Repair    BREAST LUMPECTOMY  08/01/2014    Breast Surgery Lumpectomy    COLONOSCOPY  08/01/2014    Colonoscopy (Fiberoptic)    EYE SURGERY  02/05/2015    Eye Surgery     Social History     Tobacco Use    Smoking status: Never    Smokeless tobacco: Never   Substance Use Topics    Alcohol use: Yes     Alcohol/week: 7.0 standard drinks of alcohol     Types: 7 Glasses of wine per week    Drug use: Never     No family history on file.  Immunization History   Administered Date(s) Administered    Influenza, High Dose Seasonal, Preservative Free 10/28/2019, 10/28/2020, 11/03/2021, 11/29/2022    Pfizer Purple Cap SARS-CoV-2 02/20/2021, 03/20/2021    Pneumococcal polysaccharide vaccine, 23-valent, age 2 years and older (PNEUMOVAX 23) 09/29/2004, 10/28/2019    Td vaccine, age 7 years and older  (TENIVAC) 09/29/2004    Tdap vaccine, age 7 year and older (BOOSTRIX, ADACEL) 03/12/2015     Current Outpatient Medications   Medication Instructions    carvedilol (COREG) 25 mg, oral, 2 times daily (morning and late afternoon)     Allergies   Allergen Reactions    Amlodipine Unknown and Rash    Amoxicillin Rash       Objective   Vitals:    07/26/24 1348   BP: 118/70   BP Location: Left arm   Patient Position: Sitting   BP Cuff Size: Adult   Pulse: 58   SpO2: 96%   Weight: 71.7 kg (158 lb)     Body mass index is 30.86 kg/m².    BP Readings from Last 3 Encounters:   07/26/24 118/70   04/01/24 130/68   01/22/24 142/72      Wt Readings from Last 3 Encounters:   07/26/24 71.7 kg (158 lb)   04/01/24 72.6 kg (160 lb)   01/22/24 72.2 kg (159 lb 2.8 oz)        No visits with results within 1 Month(s) from this visit.   Latest known visit with results is:   Hospital Outpatient Visit on 02/06/2024   Component Date Value    AV pk henok 02/06/2024 1.31     AV mn grad 02/06/2024 3.0     LVOT diam 02/06/2024 1.90     LV EF 02/06/2024 52     MV avg E/e' ratio 02/06/2024 18.75     MV E/A ratio 02/06/2024 0.92     LA vol index A/L 02/06/2024 29.1     Tricuspid annular plane * 02/06/2024 2.4     RV free wall pk S' 02/06/2024 13.00     LVIDd 02/06/2024 3.93     RVSP 02/06/2024 44.3     Aortic Valve Area by Con* 02/06/2024 2.09     Aortic Valve Area by Con* 02/06/2024 1.87     AV pk grad 02/06/2024 6.9     LV A4C EF 02/06/2024 52.5      Physical Exam  CONSTITUTIONAL - well nourished, well developed, looks like stated age, in no acute distress, not ill-appearing, and not tired appearing  SKIN - normal skin color and pigmentation, normal skin turgor without rash, lesions, or nodules visualized  HEAD - no trauma, normocephalic  EYES - pupils are equal and reactive to light, extraocular muscles are intact, and normal external exam  CHEST - clear to auscultation, no wheezing, no crackles and no rales, good effort  CARDIAC - regular rate and  regular rhythm, no skipped beats, no murmur  EXTREMITIES - no obvious or evident edema, no obvious or evident deformities  NEUROLOGICAL - normal gait, normal balance, normal motor, no ataxia; alert, oriented and no focal signs  PSYCHIATRIC - alert, pleasant and cordial, age-appropriate    Assessment/Plan   Problem List Items Addressed This Visit       Stage 3a chronic kidney disease (Multi) - Primary     Please complete fasting labs at your earliest convenience. If GFR drops below 45, we should consider nephrology consult         Relevant Orders    Basic metabolic panel    Hypertension     Stable, no changes to medication recommended         Relevant Medications    carvedilol (Coreg) 25 mg tablet

## 2024-07-28 NOTE — RESULT ENCOUNTER NOTE
Kidney function is stable, but your sodium is a bit low, only 1 point below normal but it is something that we will need to continue to monitor

## 2025-01-21 DIAGNOSIS — I10 PRIMARY HYPERTENSION: ICD-10-CM

## 2025-01-21 RX ORDER — CARVEDILOL 25 MG/1
25 TABLET ORAL
Qty: 28 TABLET | Refills: 0 | Status: SHIPPED | OUTPATIENT
Start: 2025-01-21 | End: 2025-02-04

## 2025-02-03 ENCOUNTER — APPOINTMENT (OUTPATIENT)
Dept: PRIMARY CARE | Facility: CLINIC | Age: 85
End: 2025-02-03
Payer: MEDICARE

## 2025-02-03 VITALS
DIASTOLIC BLOOD PRESSURE: 80 MMHG | SYSTOLIC BLOOD PRESSURE: 124 MMHG | HEIGHT: 60 IN | HEART RATE: 67 BPM | WEIGHT: 155 LBS | BODY MASS INDEX: 30.43 KG/M2

## 2025-02-03 DIAGNOSIS — I10 PRIMARY HYPERTENSION: ICD-10-CM

## 2025-02-03 DIAGNOSIS — Z00.00 MEDICARE ANNUAL WELLNESS VISIT, SUBSEQUENT: Primary | ICD-10-CM

## 2025-02-03 DIAGNOSIS — N18.31 STAGE 3A CHRONIC KIDNEY DISEASE (MULTI): ICD-10-CM

## 2025-02-03 DIAGNOSIS — Z00.00 PHYSICAL EXAM, ANNUAL: ICD-10-CM

## 2025-02-03 PROCEDURE — 1160F RVW MEDS BY RX/DR IN RCRD: CPT | Performed by: FAMILY MEDICINE

## 2025-02-03 PROCEDURE — 99397 PER PM REEVAL EST PAT 65+ YR: CPT | Performed by: FAMILY MEDICINE

## 2025-02-03 PROCEDURE — 1036F TOBACCO NON-USER: CPT | Performed by: FAMILY MEDICINE

## 2025-02-03 PROCEDURE — 3074F SYST BP LT 130 MM HG: CPT | Performed by: FAMILY MEDICINE

## 2025-02-03 PROCEDURE — 99213 OFFICE O/P EST LOW 20 MIN: CPT | Performed by: FAMILY MEDICINE

## 2025-02-03 PROCEDURE — 1170F FXNL STATUS ASSESSED: CPT | Performed by: FAMILY MEDICINE

## 2025-02-03 PROCEDURE — 1159F MED LIST DOCD IN RCRD: CPT | Performed by: FAMILY MEDICINE

## 2025-02-03 PROCEDURE — G0439 PPPS, SUBSEQ VISIT: HCPCS | Performed by: FAMILY MEDICINE

## 2025-02-03 PROCEDURE — 3079F DIAST BP 80-89 MM HG: CPT | Performed by: FAMILY MEDICINE

## 2025-02-03 PROCEDURE — 1158F ADVNC CARE PLAN TLK DOCD: CPT | Performed by: FAMILY MEDICINE

## 2025-02-03 PROCEDURE — 1123F ACP DISCUSS/DSCN MKR DOCD: CPT | Performed by: FAMILY MEDICINE

## 2025-02-03 RX ORDER — CARVEDILOL 25 MG/1
25 TABLET ORAL
Qty: 180 TABLET | Refills: 1 | Status: SHIPPED | OUTPATIENT
Start: 2025-02-03 | End: 2025-08-02

## 2025-02-03 ASSESSMENT — PATIENT HEALTH QUESTIONNAIRE - PHQ9
1. LITTLE INTEREST OR PLEASURE IN DOING THINGS: NOT AT ALL
2. FEELING DOWN, DEPRESSED OR HOPELESS: NOT AT ALL
SUM OF ALL RESPONSES TO PHQ9 QUESTIONS 1 AND 2: 0
2. FEELING DOWN, DEPRESSED OR HOPELESS: NOT AT ALL
1. LITTLE INTEREST OR PLEASURE IN DOING THINGS: NOT AT ALL
2. FEELING DOWN, DEPRESSED OR HOPELESS: NOT AT ALL
1. LITTLE INTEREST OR PLEASURE IN DOING THINGS: NOT AT ALL

## 2025-02-03 ASSESSMENT — ACTIVITIES OF DAILY LIVING (ADL)
BATHING: INDEPENDENT
MANAGING_FINANCES: INDEPENDENT
TAKING_MEDICATION: INDEPENDENT
GROCERY_SHOPPING: INDEPENDENT
DRESSING: INDEPENDENT
DOING_HOUSEWORK: INDEPENDENT

## 2025-02-03 ASSESSMENT — ENCOUNTER SYMPTOMS
DEPRESSION: 0
LOSS OF SENSATION IN FEET: 0
OCCASIONAL FEELINGS OF UNSTEADINESS: 0

## 2025-02-03 NOTE — ASSESSMENT & PLAN NOTE
Questions were reviewed and answered  Reviewed POA and living will completed  Reviewed having a discussion with code status with  and let us know at next visit

## 2025-02-03 NOTE — PROGRESS NOTES
Subjective   Reason for Visit: Gloria Yousif is an 84 y.o. female here for a Hypertension, Medicare Annual Wellness Visit Subsequent, Annual Exam, and Chronic Kidney Disease.     Past Medical, Surgical, and Family History reviewed and updated in chart.    Reviewed all medications by prescribing practitioner or clinical pharmacist (such as prescriptions, OTCs, herbal therapies and supplements) and documented in the medical record.    HPI  1. Medicare wellness/physical exam:  Gloria has completed all recommended screening tests. Immunizations are up to date.    2. Primary Hypertension:  Her condition is stable on Coreg. She denies experiencing any symptoms of hypotension.     3. Chronic Kidney Disease (CKD):  A repeat Comprehensive Metabolic Panel (CMP) is needed.    Review of Systems  All pertinent positive symptoms are included in the history of present illness.    All other systems have been reviewed and are negative and noncontributory to this patient's current ailments.    Past Medical History:   Diagnosis Date    Chronic kidney disease, stage 3a (Multi) 11/03/2021    Stage 3a chronic kidney disease     Past Surgical History:   Procedure Laterality Date    ANKLE ARTHROSCOPY W/ OPEN REPAIR  08/01/2014    Ankle Repair    BREAST LUMPECTOMY  08/01/2014    Breast Surgery Lumpectomy    COLONOSCOPY  08/01/2014    Colonoscopy (Fiberoptic)    EYE SURGERY  02/05/2015    Eye Surgery     Social History     Tobacco Use    Smoking status: Never    Smokeless tobacco: Never   Substance Use Topics    Alcohol use: Yes     Alcohol/week: 7.0 standard drinks of alcohol     Types: 7 Glasses of wine per week    Drug use: Never     No family history on file.  Allergies   Allergen Reactions    Amlodipine Unknown and Rash    Amoxicillin Rash     Immunization History   Administered Date(s) Administered    Flu vaccine, trivalent, preservative free, HIGH-DOSE, age 65y+ (Fluzone) 10/28/2019, 10/28/2020, 11/03/2021, 11/29/2022     Pfizer Purple Cap SARS-CoV-2 02/20/2021, 03/20/2021    Pneumococcal polysaccharide vaccine, 23-valent, age 2 years and older (PNEUMOVAX 23) 09/29/2004, 10/28/2019    Td vaccine, age 7 years and older (TENIVAC) 09/29/2004    Tdap vaccine, age 7 year and older (BOOSTRIX, ADACEL) 03/12/2015     Current Outpatient Medications   Medication Instructions    carvedilol (COREG) 25 mg, oral, 2 times daily (morning and late afternoon)       Patient Self Assessment of Health Status  Patient Self Assessment: Good    Nutrition and Exercise  Current Diet: Well Balanced Diet  Adequate Fluid Intake: Yes  Caffeine: Yes  Exercise Frequency: No Exercise    Functional Ability/Level of Safety  Cognitive Impairment Observed: No cognitive impairment observed  Cognitive Impairment Reported: No cognitive impairment reported by patient or family    Home Safety Risk Factors: None    Objective   Visit Vitals  /80   Pulse 67   Ht 1.524 m (5')   Wt 70.3 kg (155 lb)   BMI 30.27 kg/m²   Smoking Status Never   BSA 1.73 m²      No visits with results within 1 Month(s) from this visit.   Latest known visit with results is:   Lab on 07/26/2024   Component Date Value    Glucose 07/26/2024 90     Sodium 07/26/2024 135 (L)     Potassium 07/26/2024 5.0     Chloride 07/26/2024 104     Bicarbonate 07/26/2024 26     Anion Gap 07/26/2024 10     Urea Nitrogen 07/26/2024 20     Creatinine 07/26/2024 1.17 (H)     eGFR 07/26/2024 46 (L)     Calcium 07/26/2024 8.8      The ASCVD Risk score (Dania DK, et al., 2019) failed to calculate for the following reasons:    The 2019 ASCVD risk score is only valid for ages 40 to 79    Risk score cannot be calculated because patient has a medical history suggesting prior/existing ASCVD    Physical Exam  CONSTITUTIONAL - well nourished, well developed, looks like stated age, in no acute distress, not ill-appearing, and not tired appearing  SKIN - normal skin color and pigmentation, normal skin turgor without rash,  lesions, or nodules visualized  HEAD - no trauma, normocephalic  EYES - pupils are equal and reactive to light, extraocular muscles are intact, and normal external exam  ENT - TM's intact, no injection, no signs of infection, uvula midline, normal tongue movement and throat normal, no exudate  NECK - supple without rigidity, no neck mass was observed, no thyromegaly or thyroid nodules  CHEST - clear to auscultation, no wheezing, no crackles and no rales, good effort  CARDIAC - regular rate and regular rhythm, no skipped beats, no murmur  ABDOMEN - no organomegaly, soft, nontender, nondistended, normal bowel sounds, no guarding/rebound/rigidity, negative McBurney sign and negative Solis sign  EXTREMITIES - no obvious or evident edema, no obvious or evident deformities  NEUROLOGICAL -  alert, oriented and no focal signs  PSYCHIATRIC - alert, pleasant and cordial, age-appropriate  IMMUNOLOGIC - no cervical lymphadenopathy    Assessment/Plan   Problem List Items Addressed This Visit       Stage 3a chronic kidney disease (Multi)    Current Assessment & Plan     Please complete fasting labs at your earliest convenience. If GFR drops below 45, we should consider nephrology consult         Relevant Orders    Comprehensive metabolic panel    Hypertension    Current Assessment & Plan     Stable, no changes to medication recommended         Relevant Medications    carvedilol (Coreg) 25 mg tablet    Medicare annual wellness visit, subsequent - Primary    Current Assessment & Plan     Questions were reviewed and answered  Reviewed POA and living will completed  Reviewed having a discussion with code status with  and let us know at next visit         Physical exam, annual    Current Assessment & Plan     Complete history and physical examination was performed  We will notify of test results once available          Patient Care Team:  Félix Arthur DO as PCP - General (Family Medicine)  Félix Arthur DO as PCP - [employer] PCP

## 2025-02-04 DIAGNOSIS — N18.31 STAGE 3A CHRONIC KIDNEY DISEASE (MULTI): Primary | ICD-10-CM

## 2025-02-04 LAB
ALBUMIN SERPL-MCNC: 4 G/DL (ref 3.6–5.1)
ALP SERPL-CCNC: 67 U/L (ref 37–153)
ALT SERPL-CCNC: 8 U/L (ref 6–29)
ANION GAP SERPL CALCULATED.4IONS-SCNC: 9 MMOL/L (CALC) (ref 7–17)
AST SERPL-CCNC: 11 U/L (ref 10–35)
BILIRUB SERPL-MCNC: 0.4 MG/DL (ref 0.2–1.2)
BUN SERPL-MCNC: 27 MG/DL (ref 7–25)
CALCIUM SERPL-MCNC: 9.1 MG/DL (ref 8.6–10.4)
CHLORIDE SERPL-SCNC: 105 MMOL/L (ref 98–110)
CO2 SERPL-SCNC: 25 MMOL/L (ref 20–32)
CREAT SERPL-MCNC: 1.36 MG/DL (ref 0.6–0.95)
EGFRCR SERPLBLD CKD-EPI 2021: 38 ML/MIN/1.73M2
GLUCOSE SERPL-MCNC: 99 MG/DL (ref 65–99)
POTASSIUM SERPL-SCNC: 5.4 MMOL/L (ref 3.5–5.3)
PROT SERPL-MCNC: 6.6 G/DL (ref 6.1–8.1)
SODIUM SERPL-SCNC: 139 MMOL/L (ref 135–146)

## 2025-02-04 NOTE — RESULT ENCOUNTER NOTE
Kidney function shows some decline with creatinine 1.36, GFR 38 previously creatinine 1.17, GFR 46.  Initially I would suggest trying to increase your water intake, but this is the first time your GFR has dropped below 40 which can be a pretty good indicator of kidney filtration problems, so I would recommend repeating the blood work in a month and if the GFR stays at this level or drops, then a consultation with a kidney specialist would be warranted

## 2025-03-04 DIAGNOSIS — N18.31 STAGE 3A CHRONIC KIDNEY DISEASE (MULTI): ICD-10-CM

## 2025-07-14 ENCOUNTER — APPOINTMENT (OUTPATIENT)
Dept: PRIMARY CARE | Facility: CLINIC | Age: 85
End: 2025-07-14
Payer: MEDICARE

## 2025-07-14 VITALS
WEIGHT: 152 LBS | DIASTOLIC BLOOD PRESSURE: 80 MMHG | BODY MASS INDEX: 29.84 KG/M2 | HEART RATE: 70 BPM | SYSTOLIC BLOOD PRESSURE: 124 MMHG | HEIGHT: 60 IN

## 2025-07-14 DIAGNOSIS — N18.31 STAGE 3A CHRONIC KIDNEY DISEASE (MULTI): ICD-10-CM

## 2025-07-14 DIAGNOSIS — M54.2 NECK PAIN: Primary | ICD-10-CM

## 2025-07-14 PROBLEM — M62.838 MUSCLE SPASM: Status: ACTIVE | Noted: 2025-07-14

## 2025-07-14 PROCEDURE — 3079F DIAST BP 80-89 MM HG: CPT

## 2025-07-14 PROCEDURE — G2211 COMPLEX E/M VISIT ADD ON: HCPCS

## 2025-07-14 PROCEDURE — 99213 OFFICE O/P EST LOW 20 MIN: CPT

## 2025-07-14 PROCEDURE — 1036F TOBACCO NON-USER: CPT

## 2025-07-14 PROCEDURE — 1159F MED LIST DOCD IN RCRD: CPT

## 2025-07-14 PROCEDURE — 3074F SYST BP LT 130 MM HG: CPT

## 2025-07-14 RX ORDER — CYCLOBENZAPRINE HCL 5 MG
5 TABLET ORAL NIGHTLY PRN
Qty: 10 TABLET | Refills: 0 | Status: SHIPPED | OUTPATIENT
Start: 2025-07-14

## 2025-07-14 NOTE — PROGRESS NOTES
Chief Complaint  Patient ID: Gloria Yousif is a 84 y.o. female who presents for Neck Pain.    Past Medical, Surgical, and Family History reviewed and updated in chart.    Reviewed all medications by prescribing practitioner or clinical pharmacist (such as prescriptions, OTCs, herbal therapies and supplements) and documented in the medical record.    History of Present Illness  1.  Stiff neck  Pat is endorsing a one month history of right sided neck stiffness that is non-radicular   She denies any trauma or injuries however her message therapist believes she could have tweaked her neck while sleeping which Pat agrees  Pat denies any other neurologic symptoms such as eye blurriness or headaches   She has tried topical remedies without much improvement    2. CKD stage 3  Pat has stable CKD but has not had recent blood work  She notes not drinking enough water throughout the day    Review of Systems  All pertinent positive symptoms are included in the history of present illness.    All other systems have been reviewed and are negative and noncontributory to this patient's current ailments.    Past Medical History  She has a past medical history of Chronic kidney disease, stage 3a (Multi) (11/03/2021).    Surgical History  She has a past surgical history that includes Ankle arthroscopy w/ open repair (08/01/2014); Breast lumpectomy (08/01/2014); Colonoscopy (08/01/2014); and Eye surgery (02/05/2015).     Social History  She reports that she has never smoked. She has never used smokeless tobacco. She reports current alcohol use of about 7.0 standard drinks of alcohol per week. She reports that she does not use drugs.    Family History[1]  Medications Prior to Visit[2]    Allergies  Amlodipine and Amoxicillin    Immunization History   Administered Date(s) Administered    Flu vaccine, trivalent, preservative free, HIGH-DOSE, age 65y+ (Fluzone) 10/28/2019, 10/28/2020, 11/03/2021, 11/29/2022    Pfizer Purple Cap  SARS-CoV-2 02/20/2021, 03/20/2021    Pneumococcal polysaccharide vaccine, 23-valent, age 2 years and older (PNEUMOVAX 23) 09/29/2004, 10/28/2019    Td vaccine, age 7 years and older (TENIVAC) 09/29/2004    Tdap vaccine, age 7 year and older (BOOSTRIX, ADACEL) 03/12/2015     Objective   Visit Vitals  /80   Pulse 70   Ht (!) 1.524 m (5')   Wt 68.9 kg (152 lb)   BMI 29.69 kg/m²   Smoking Status Never   BSA 1.71 m²        BP Readings from Last 3 Encounters:   07/14/25 124/80   02/03/25 124/80   07/26/24 118/70      Wt Readings from Last 3 Encounters:   07/14/25 68.9 kg (152 lb)   02/03/25 70.3 kg (155 lb)   07/26/24 71.7 kg (158 lb)       Relevant Results  No visits with results within 1 Month(s) from this visit.   Latest known visit with results is:   Office Visit on 02/03/2025   Component Date Value    GLUCOSE 02/03/2025 99     UREA NITROGEN (BUN) 02/03/2025 27 (H)     CREATININE 02/03/2025 1.36 (H)     EGFR 02/03/2025 38 (L)     SODIUM 02/03/2025 139     POTASSIUM 02/03/2025 5.4 (H)     CHLORIDE 02/03/2025 105     CARBON DIOXIDE 02/03/2025 25     ELECTROLYTE BALANCE 02/03/2025 9     CALCIUM 02/03/2025 9.1     PROTEIN, TOTAL 02/03/2025 6.6     ALBUMIN 02/03/2025 4.0     BILIRUBIN, TOTAL 02/03/2025 0.4     ALKALINE PHOSPHATASE 02/03/2025 67     AST 02/03/2025 11     ALT 02/03/2025 8      The ASCVD Risk score (Dania DK, et al., 2019) failed to calculate for the following reasons:    The 2019 ASCVD risk score is only valid for ages 40 to 79    Risk score cannot be calculated because patient has a medical history suggesting prior/existing ASCVD    Physical Exam  CONSTITUTIONAL - well nourished, well developed, looks like stated age, in no acute distress, not ill-appearing, and not tired appearing  SKIN - normal skin color and pigmentation, normal skin turgor without rash, lesions, or nodules visualized  HEAD - no trauma, normocephalic  EYES - pupils are equal and reactive to light, extraocular muscles are intact,  and normal external exam  NECK - supple without rigidity, no neck mass was observed, no thyromegaly or thyroid nodules  CHEST - clear to auscultation, no wheezing, no crackles and no rales, good effort  CARDIAC - regular rate and regular rhythm, no skipped beats, no murmur  EXTREMITIES - no obvious or evident edema, no obvious or evident deformities  NEUROLOGICAL - alert, oriented and no focal signs  PSYCHIATRIC - alert, pleasant and cordial, age-appropriate  IMMUNOLOGIC - no cervical lymphadenopathy    Assessment and Plan  Problem List Items Addressed This Visit       Stage 3a chronic kidney disease (Multi)    We will repeat your labs at this time and notify you once the results are available.           Relevant Orders    Comprehensive metabolic panel    Neck pain - Primary    I suspect patient's symptoms are secondary to muscular spasm. I believe she would benefit from physical therapy and a low dose of muscle relaxer which has been sent to the pharmacy. If symptoms do not improve, we can consider a trapezius trigger injection in the office         Relevant Medications    cyclobenzaprine (Flexeril) 5 mg tablet    Other Relevant Orders    Referral to Physical Therapy    XR cervical spine 2-3 views          [1] No family history on file.  [2]   Outpatient Medications Prior to Visit   Medication Sig Dispense Refill    carvedilol (Coreg) 25 mg tablet Take 1 tablet (25 mg) by mouth 2 times daily (morning and late afternoon). 180 tablet 1     No facility-administered medications prior to visit.

## 2025-07-14 NOTE — ASSESSMENT & PLAN NOTE
I suspect patient's symptoms are secondary to muscular spasm. I believe she would benefit from physical therapy and a low dose of muscle relaxer which has been sent to the pharmacy. If symptoms do not improve, we can consider a trapezius trigger injection in the office

## 2025-07-15 LAB
ALBUMIN SERPL-MCNC: 4.1 G/DL (ref 3.6–5.1)
ALP SERPL-CCNC: 61 U/L (ref 37–153)
ALT SERPL-CCNC: 8 U/L (ref 6–29)
ANION GAP SERPL CALCULATED.4IONS-SCNC: 12 MMOL/L (CALC) (ref 7–17)
AST SERPL-CCNC: 12 U/L (ref 10–35)
BILIRUB SERPL-MCNC: 0.4 MG/DL (ref 0.2–1.2)
BUN SERPL-MCNC: 22 MG/DL (ref 7–25)
CALCIUM SERPL-MCNC: 9.6 MG/DL (ref 8.6–10.4)
CHLORIDE SERPL-SCNC: 105 MMOL/L (ref 98–110)
CO2 SERPL-SCNC: 21 MMOL/L (ref 20–32)
CREAT SERPL-MCNC: 1.26 MG/DL (ref 0.6–0.95)
EGFRCR SERPLBLD CKD-EPI 2021: 42 ML/MIN/1.73M2
GLUCOSE SERPL-MCNC: 90 MG/DL (ref 65–99)
POTASSIUM SERPL-SCNC: 4.7 MMOL/L (ref 3.5–5.3)
PROT SERPL-MCNC: 6.9 G/DL (ref 6.1–8.1)
SODIUM SERPL-SCNC: 138 MMOL/L (ref 135–146)

## 2025-08-12 ENCOUNTER — APPOINTMENT (OUTPATIENT)
Dept: PRIMARY CARE | Facility: CLINIC | Age: 85
End: 2025-08-12
Payer: MEDICARE

## 2025-08-12 VITALS
DIASTOLIC BLOOD PRESSURE: 90 MMHG | BODY MASS INDEX: 29.45 KG/M2 | RESPIRATION RATE: 16 BRPM | OXYGEN SATURATION: 96 % | SYSTOLIC BLOOD PRESSURE: 150 MMHG | TEMPERATURE: 98.6 F | HEIGHT: 60 IN | HEART RATE: 66 BPM | WEIGHT: 150 LBS

## 2025-08-12 DIAGNOSIS — R94.5 ABNORMAL LIVER FUNCTION: ICD-10-CM

## 2025-08-12 DIAGNOSIS — R73.01 ELEVATED FASTING GLUCOSE: ICD-10-CM

## 2025-08-12 DIAGNOSIS — E78.5 HYPERLIPIDEMIA, UNSPECIFIED HYPERLIPIDEMIA TYPE: ICD-10-CM

## 2025-08-12 DIAGNOSIS — R79.9 ABNORMAL BLOOD CHEMISTRY: ICD-10-CM

## 2025-08-12 DIAGNOSIS — Z13.29 THYROID DISORDER SCREENING: ICD-10-CM

## 2025-08-12 DIAGNOSIS — I10 PRIMARY HYPERTENSION: ICD-10-CM

## 2025-08-12 DIAGNOSIS — E53.8 VITAMIN B12 DEFICIENCY: ICD-10-CM

## 2025-08-12 DIAGNOSIS — E55.9 VITAMIN D DEFICIENCY: ICD-10-CM

## 2025-08-12 DIAGNOSIS — S16.1XXS STRAIN OF NECK MUSCLE, SEQUELA: ICD-10-CM

## 2025-08-12 DIAGNOSIS — K58.9 IRRITABLE BOWEL SYNDROME, UNSPECIFIED TYPE: Primary | ICD-10-CM

## 2025-08-12 DIAGNOSIS — N39.0 URINARY TRACT INFECTION WITHOUT HEMATURIA, SITE UNSPECIFIED: ICD-10-CM

## 2025-08-12 DIAGNOSIS — R73.09 ELEVATED GLUCOSE: ICD-10-CM

## 2025-08-12 PROCEDURE — 1125F AMNT PAIN NOTED PAIN PRSNT: CPT | Performed by: INTERNAL MEDICINE

## 2025-08-12 PROCEDURE — 99213 OFFICE O/P EST LOW 20 MIN: CPT | Performed by: INTERNAL MEDICINE

## 2025-08-12 PROCEDURE — 3080F DIAST BP >= 90 MM HG: CPT | Performed by: INTERNAL MEDICINE

## 2025-08-12 PROCEDURE — 1036F TOBACCO NON-USER: CPT | Performed by: INTERNAL MEDICINE

## 2025-08-12 PROCEDURE — 3077F SYST BP >= 140 MM HG: CPT | Performed by: INTERNAL MEDICINE

## 2025-08-12 PROCEDURE — 1159F MED LIST DOCD IN RCRD: CPT | Performed by: INTERNAL MEDICINE

## 2025-08-12 RX ORDER — DICYCLOMINE HYDROCHLORIDE 10 MG/1
10 CAPSULE ORAL 4 TIMES DAILY PRN
Qty: 30 CAPSULE | Refills: 0 | Status: SHIPPED | OUTPATIENT
Start: 2025-08-12 | End: 2025-10-11

## 2025-08-12 RX ORDER — CARVEDILOL 25 MG/1
25 TABLET ORAL
Qty: 180 TABLET | Refills: 3 | Status: SHIPPED | OUTPATIENT
Start: 2025-08-12 | End: 2026-08-07

## 2025-08-12 ASSESSMENT — PATIENT HEALTH QUESTIONNAIRE - PHQ9
2. FEELING DOWN, DEPRESSED OR HOPELESS: NOT AT ALL
6. FEELING BAD ABOUT YOURSELF - OR THAT YOU ARE A FAILURE OR HAVE LET YOURSELF OR YOUR FAMILY DOWN: NOT AT ALL
7. TROUBLE CONCENTRATING ON THINGS, SUCH AS READING THE NEWSPAPER OR WATCHING TELEVISION: NOT AT ALL
9. THOUGHTS THAT YOU WOULD BE BETTER OFF DEAD, OR OF HURTING YOURSELF: NOT AT ALL
1. LITTLE INTEREST OR PLEASURE IN DOING THINGS: NOT AT ALL
SUM OF ALL RESPONSES TO PHQ9 QUESTIONS 1 AND 2: 0
5. POOR APPETITE OR OVEREATING: NOT AT ALL
3. TROUBLE FALLING OR STAYING ASLEEP OR SLEEPING TOO MUCH: NOT AT ALL
4. FEELING TIRED OR HAVING LITTLE ENERGY: NOT AT ALL
8. MOVING OR SPEAKING SO SLOWLY THAT OTHER PEOPLE COULD HAVE NOTICED. OR THE OPPOSITE, BEING SO FIGETY OR RESTLESS THAT YOU HAVE BEEN MOVING AROUND A LOT MORE THAN USUAL: NOT AT ALL
SUM OF ALL RESPONSES TO PHQ QUESTIONS 1-9: 0

## 2025-08-12 ASSESSMENT — ANXIETY QUESTIONNAIRES
4. TROUBLE RELAXING: NOT AT ALL
1. FEELING NERVOUS, ANXIOUS, OR ON EDGE: NOT AT ALL
5. BEING SO RESTLESS THAT IT IS HARD TO SIT STILL: NOT AT ALL
3. WORRYING TOO MUCH ABOUT DIFFERENT THINGS: NOT AT ALL
7. FEELING AFRAID AS IF SOMETHING AWFUL MIGHT HAPPEN: NOT AT ALL
2. NOT BEING ABLE TO STOP OR CONTROL WORRYING: NOT AT ALL
IF YOU CHECKED OFF ANY PROBLEMS ON THIS QUESTIONNAIRE, HOW DIFFICULT HAVE THESE PROBLEMS MADE IT FOR YOU TO DO YOUR WORK, TAKE CARE OF THINGS AT HOME, OR GET ALONG WITH OTHER PEOPLE: NOT DIFFICULT AT ALL
GAD7 TOTAL SCORE: 0
6. BECOMING EASILY ANNOYED OR IRRITABLE: NOT AT ALL

## 2025-08-12 ASSESSMENT — PAIN SCALES - GENERAL: PAINLEVEL_OUTOF10: 2

## 2025-08-15 LAB
25(OH)D3+25(OH)D2 SERPL-MCNC: 65 NG/ML (ref 30–100)
ALT SERPL-CCNC: 8 U/L (ref 6–29)
ANION GAP SERPL CALCULATED.4IONS-SCNC: 12 MMOL/L (CALC) (ref 7–17)
APPEARANCE UR: CLEAR
BILIRUB UR QL STRIP: NEGATIVE
BUN SERPL-MCNC: 20 MG/DL (ref 7–25)
BUN/CREAT SERPL: 16 (CALC) (ref 6–22)
CALCIUM SERPL-MCNC: 9.1 MG/DL (ref 8.6–10.4)
CHLORIDE SERPL-SCNC: 106 MMOL/L (ref 98–110)
CHOLEST SERPL-MCNC: 190 MG/DL
CHOLEST/HDLC SERPL: 4.1 (CALC)
CO2 SERPL-SCNC: 23 MMOL/L (ref 20–32)
COLOR UR: YELLOW
CREAT SERPL-MCNC: 1.28 MG/DL (ref 0.6–0.95)
EGFRCR SERPLBLD CKD-EPI 2021: 41 ML/MIN/1.73M2
ERYTHROCYTE [DISTWIDTH] IN BLOOD BY AUTOMATED COUNT: 12.8 % (ref 11–15)
EST. AVERAGE GLUCOSE BLD GHB EST-MCNC: 108 MG/DL
EST. AVERAGE GLUCOSE BLD GHB EST-SCNC: 6 MMOL/L
GLUCOSE SERPL-MCNC: 85 MG/DL (ref 65–99)
GLUCOSE UR QL STRIP: NEGATIVE
HBA1C MFR BLD: 5.4 %
HCT VFR BLD AUTO: 42.4 % (ref 35–45)
HDLC SERPL-MCNC: 46 MG/DL
HGB BLD-MCNC: 13.8 G/DL (ref 11.7–15.5)
HGB UR QL STRIP: NEGATIVE
KETONES UR QL STRIP: NEGATIVE
LDLC SERPL CALC-MCNC: 121 MG/DL (CALC)
LEUKOCYTE ESTERASE UR QL STRIP: NEGATIVE
MCH RBC QN AUTO: 30.9 PG (ref 27–33)
MCHC RBC AUTO-ENTMCNC: 32.5 G/DL (ref 32–36)
MCV RBC AUTO: 95.1 FL (ref 80–100)
NITRITE UR QL STRIP: NEGATIVE
NONHDLC SERPL-MCNC: 144 MG/DL (CALC)
PH UR STRIP: 6.5 [PH] (ref 5–8)
PLATELET # BLD AUTO: 191 THOUSAND/UL (ref 140–400)
PMV BLD REES-ECKER: 11.4 FL (ref 7.5–12.5)
POTASSIUM SERPL-SCNC: 4.5 MMOL/L (ref 3.5–5.3)
PROT UR QL STRIP: NEGATIVE
RBC # BLD AUTO: 4.46 MILLION/UL (ref 3.8–5.1)
SODIUM SERPL-SCNC: 141 MMOL/L (ref 135–146)
SP GR UR STRIP: 1.01 (ref 1–1.03)
TRIGL SERPL-MCNC: 115 MG/DL
TSH SERPL-ACNC: 3.39 MIU/L (ref 0.4–4.5)
VIT B12 SERPL-MCNC: 607 PG/ML (ref 200–1100)
WBC # BLD AUTO: 7.2 THOUSAND/UL (ref 3.8–10.8)

## 2025-08-18 ENCOUNTER — EVALUATION (OUTPATIENT)
Dept: PHYSICAL THERAPY | Facility: CLINIC | Age: 85
End: 2025-08-18
Payer: MEDICARE

## 2025-08-18 DIAGNOSIS — M54.2 NECK PAIN: Primary | ICD-10-CM

## 2025-08-18 PROCEDURE — 97110 THERAPEUTIC EXERCISES: CPT | Mod: GP | Performed by: PHYSICAL THERAPIST

## 2025-08-18 PROCEDURE — 97161 PT EVAL LOW COMPLEX 20 MIN: CPT | Mod: GP | Performed by: PHYSICAL THERAPIST

## 2025-08-18 ASSESSMENT — ENCOUNTER SYMPTOMS
LOSS OF SENSATION IN FEET: 0
OCCASIONAL FEELINGS OF UNSTEADINESS: 0
DEPRESSION: 0